# Patient Record
Sex: MALE | Race: WHITE | URBAN - METROPOLITAN AREA
[De-identification: names, ages, dates, MRNs, and addresses within clinical notes are randomized per-mention and may not be internally consistent; named-entity substitution may affect disease eponyms.]

---

## 2024-02-22 ENCOUNTER — OFFICE VISIT (OUTPATIENT)
Age: 24
End: 2024-02-22

## 2024-02-22 VITALS
BODY MASS INDEX: 23.92 KG/M2 | HEART RATE: 96 BPM | HEIGHT: 69 IN | TEMPERATURE: 98.9 F | DIASTOLIC BLOOD PRESSURE: 75 MMHG | OXYGEN SATURATION: 96 % | WEIGHT: 161.5 LBS | SYSTOLIC BLOOD PRESSURE: 116 MMHG

## 2024-02-22 DIAGNOSIS — J03.90 EXUDATIVE TONSILLITIS: Primary | ICD-10-CM

## 2024-02-22 RX ORDER — SERTRALINE HYDROCHLORIDE 100 MG/1
100 TABLET, FILM COATED ORAL DAILY
COMMUNITY

## 2024-02-22 RX ORDER — CALCIUM CARBONATE/VITAMIN D3 600MG-62.5
2 CAPSULE ORAL DAILY
COMMUNITY

## 2024-02-22 RX ORDER — GUANFACINE 2 MG/1
2 TABLET, EXTENDED RELEASE ORAL EVERY MORNING
COMMUNITY
Start: 2023-12-02

## 2024-02-22 RX ORDER — CETIRIZINE HYDROCHLORIDE 10 MG/1
TABLET ORAL
COMMUNITY

## 2024-02-22 RX ORDER — AMOXICILLIN 500 MG/1
500 CAPSULE ORAL 2 TIMES DAILY
Qty: 20 CAPSULE | Refills: 0 | Status: SHIPPED | OUTPATIENT
Start: 2024-02-22 | End: 2024-03-03

## 2024-02-22 RX ORDER — AMOXICILLIN 500 MG/1
500 CAPSULE ORAL 2 TIMES DAILY
Qty: 20 CAPSULE | Refills: 0 | Status: SHIPPED | OUTPATIENT
Start: 2024-02-22 | End: 2024-02-22

## 2024-02-22 RX ORDER — METHYLPHENIDATE HYDROCHLORIDE 10 MG/1
10 TABLET ORAL EVERY MORNING
COMMUNITY

## 2024-02-22 ASSESSMENT — ENCOUNTER SYMPTOMS
SORE THROAT: 1
VOMITING: 0

## 2024-02-23 NOTE — PROGRESS NOTES
Crescencio Freed (:  2000) is a 23 y.o. male,New patient, here for evaluation of the following chief complaint(s):  Pharyngitis and Fever (Pt c/o his throat sore and fever started yesterday.)      ASSESSMENT/PLAN:  Visit Diagnoses and Associated Orders       Exudative tonsillitis    -  Primary    POCT rapid strep A [65816 Custom]      amoxicillin (AMOXIL) 500 MG capsule [451]           ORDERS WITHOUT AN ASSOCIATED DIAGNOSIS    N-ACETYL CYSTEINE 600 MG CAPS [176499]      cetirizine (ZYRTEC) 10 MG tablet [6976]      guanFACINE (INTUNIV) 2 MG TB24 extended release tablet [92590]      methylphenidate (RITALIN) 10 MG tablet [3786]      sertraline (ZOLOFT) 100 MG tablet [39883]             Rapid strep was NEG.  Based on history and signs on exam, will continue with treatment with antibiotic.  Take as prescribed.  Continue conservative care for pain and fever as needed.  Return if symptoms persist or change.  Proceed to hospital for evaluation if any worsening symptoms or concerns.      SUBJECTIVE/OBJECTIVE:    History of recurrent strep infections.  Feels like strep again.        History provided by:  Patient   used: No    Pharyngitis  Duration:  2 days  Timing:  Constant  Progression:  Unchanged  Chronicity:  New  Associated symptoms: congestion, fatigue, fever and sore throat    Associated symptoms: no headaches, no myalgias and no vomiting        ROS: See HPI       Vitals:    24 1915   BP: 116/75   Pulse: 96   Temp: 98.9 °F (37.2 °C)   TempSrc: Oral   SpO2: 96%   Weight: 73.3 kg (161 lb 8 oz)   Height: 1.74 m (5' 8.5\")       No results found for this visit on 24.     Physical Exam  Vitals and nursing note reviewed.   Constitutional:       Appearance: Normal appearance. He is not ill-appearing.   HENT:      Right Ear: Tympanic membrane and ear canal normal.      Left Ear: Tympanic membrane and ear canal normal.      Nose: Nose normal.      Mouth/Throat:      Pharynx:

## 2024-02-23 NOTE — PATIENT INSTRUCTIONS
Rapid strep was NEG.  Based on history and signs on exam, will continue with treatment with antibiotic.  Take as prescribed.  Continue conservative care for pain and fever as needed.  Return if symptoms persist or change.  Proceed to hospital for evaluation if any worsening symptoms or concerns.